# Patient Record
Sex: FEMALE | Race: WHITE | Employment: OTHER | URBAN - METROPOLITAN AREA
[De-identification: names, ages, dates, MRNs, and addresses within clinical notes are randomized per-mention and may not be internally consistent; named-entity substitution may affect disease eponyms.]

---

## 2018-01-22 ENCOUNTER — OFFICE VISIT - HEALTHEAST (OUTPATIENT)
Dept: GERIATRICS | Facility: CLINIC | Age: 83
End: 2018-01-22

## 2018-01-22 ENCOUNTER — AMBULATORY - HEALTHEAST (OUTPATIENT)
Dept: GERIATRICS | Facility: CLINIC | Age: 83
End: 2018-01-22

## 2018-01-22 DIAGNOSIS — I10 ESSENTIAL HYPERTENSION: ICD-10-CM

## 2018-01-22 DIAGNOSIS — K59.00 CONSTIPATION: ICD-10-CM

## 2018-01-22 DIAGNOSIS — R52 PAIN MANAGEMENT: ICD-10-CM

## 2018-01-22 DIAGNOSIS — E78.5 HYPERLIPIDEMIA, UNSPECIFIED HYPERLIPIDEMIA TYPE: ICD-10-CM

## 2018-01-22 DIAGNOSIS — S32.519A: ICD-10-CM

## 2018-01-22 DIAGNOSIS — G47.00 INSOMNIA: ICD-10-CM

## 2018-01-23 ENCOUNTER — AMBULATORY - HEALTHEAST (OUTPATIENT)
Dept: ADMINISTRATIVE | Facility: CLINIC | Age: 83
End: 2018-01-23

## 2018-01-25 ENCOUNTER — OFFICE VISIT - HEALTHEAST (OUTPATIENT)
Dept: GERIATRICS | Facility: CLINIC | Age: 83
End: 2018-01-25

## 2018-01-25 DIAGNOSIS — S32.511D CLOSED FRACTURE OF RIGHT SUPERIOR PUBIC RAMUS WITH ROUTINE HEALING, SUBSEQUENT ENCOUNTER: ICD-10-CM

## 2018-01-25 DIAGNOSIS — M81.0 OSTEOPOROSIS, UNSPECIFIED OSTEOPOROSIS TYPE, UNSPECIFIED PATHOLOGICAL FRACTURE PRESENCE: ICD-10-CM

## 2018-01-25 DIAGNOSIS — M15.0 PRIMARY OSTEOARTHRITIS INVOLVING MULTIPLE JOINTS: ICD-10-CM

## 2018-01-29 ENCOUNTER — OFFICE VISIT - HEALTHEAST (OUTPATIENT)
Dept: GERIATRICS | Facility: CLINIC | Age: 83
End: 2018-01-29

## 2018-01-29 DIAGNOSIS — M81.0 OSTEOPOROSIS, UNSPECIFIED OSTEOPOROSIS TYPE, UNSPECIFIED PATHOLOGICAL FRACTURE PRESENCE: ICD-10-CM

## 2018-01-29 DIAGNOSIS — S32.511D CLOSED FRACTURE OF RIGHT SUPERIOR PUBIC RAMUS WITH ROUTINE HEALING, SUBSEQUENT ENCOUNTER: ICD-10-CM

## 2018-01-29 DIAGNOSIS — I10 ESSENTIAL HYPERTENSION: ICD-10-CM

## 2018-01-29 DIAGNOSIS — R52 PAIN MANAGEMENT: ICD-10-CM

## 2018-01-29 DIAGNOSIS — R68.89 SUSPECTED DEEP TISSUE INJURY: ICD-10-CM

## 2018-01-29 DIAGNOSIS — E78.5 HYPERLIPIDEMIA, UNSPECIFIED HYPERLIPIDEMIA TYPE: ICD-10-CM

## 2018-02-01 ENCOUNTER — OFFICE VISIT - HEALTHEAST (OUTPATIENT)
Dept: GERIATRICS | Facility: CLINIC | Age: 83
End: 2018-02-01

## 2018-02-01 DIAGNOSIS — S32.511D CLOSED FRACTURE OF RIGHT SUPERIOR PUBIC RAMUS WITH ROUTINE HEALING, SUBSEQUENT ENCOUNTER: ICD-10-CM

## 2018-02-01 DIAGNOSIS — R52 PAIN MANAGEMENT: ICD-10-CM

## 2018-02-01 DIAGNOSIS — M81.0 OSTEOPOROSIS, UNSPECIFIED OSTEOPOROSIS TYPE, UNSPECIFIED PATHOLOGICAL FRACTURE PRESENCE: ICD-10-CM

## 2018-02-05 ENCOUNTER — OFFICE VISIT - HEALTHEAST (OUTPATIENT)
Dept: GERIATRICS | Facility: CLINIC | Age: 83
End: 2018-02-05

## 2018-02-05 DIAGNOSIS — I10 ESSENTIAL HYPERTENSION: ICD-10-CM

## 2018-02-05 DIAGNOSIS — R68.89 SUSPECTED DEEP TISSUE INJURY: ICD-10-CM

## 2018-02-05 DIAGNOSIS — R52 PAIN MANAGEMENT: ICD-10-CM

## 2018-02-05 DIAGNOSIS — R60.0 BILATERAL LOWER EXTREMITY EDEMA: ICD-10-CM

## 2018-02-05 DIAGNOSIS — S32.511D CLOSED FRACTURE OF RIGHT SUPERIOR PUBIC RAMUS WITH ROUTINE HEALING, SUBSEQUENT ENCOUNTER: ICD-10-CM

## 2018-02-05 DIAGNOSIS — E78.5 HYPERLIPIDEMIA, UNSPECIFIED HYPERLIPIDEMIA TYPE: ICD-10-CM

## 2018-02-08 ENCOUNTER — OFFICE VISIT - HEALTHEAST (OUTPATIENT)
Dept: GERIATRICS | Facility: CLINIC | Age: 83
End: 2018-02-08

## 2018-02-08 DIAGNOSIS — R52 PAIN MANAGEMENT: ICD-10-CM

## 2018-02-08 DIAGNOSIS — S32.511D CLOSED FRACTURE OF RIGHT SUPERIOR PUBIC RAMUS WITH ROUTINE HEALING, SUBSEQUENT ENCOUNTER: ICD-10-CM

## 2018-02-13 ENCOUNTER — OFFICE VISIT - HEALTHEAST (OUTPATIENT)
Dept: GERIATRICS | Facility: CLINIC | Age: 83
End: 2018-02-13

## 2018-02-13 DIAGNOSIS — I10 ESSENTIAL HYPERTENSION: ICD-10-CM

## 2018-02-13 DIAGNOSIS — G47.00 INSOMNIA: ICD-10-CM

## 2018-02-13 DIAGNOSIS — S32.511D CLOSED FRACTURE OF RIGHT SUPERIOR PUBIC RAMUS WITH ROUTINE HEALING, SUBSEQUENT ENCOUNTER: ICD-10-CM

## 2018-02-13 DIAGNOSIS — R52 PAIN MANAGEMENT: ICD-10-CM

## 2018-02-13 DIAGNOSIS — R60.0 BILATERAL LOWER EXTREMITY EDEMA: ICD-10-CM

## 2018-02-15 ENCOUNTER — OFFICE VISIT - HEALTHEAST (OUTPATIENT)
Dept: GERIATRICS | Facility: CLINIC | Age: 83
End: 2018-02-15

## 2018-02-15 DIAGNOSIS — M81.0 OSTEOPOROSIS, UNSPECIFIED OSTEOPOROSIS TYPE, UNSPECIFIED PATHOLOGICAL FRACTURE PRESENCE: ICD-10-CM

## 2018-02-15 DIAGNOSIS — M15.0 PRIMARY OSTEOARTHRITIS INVOLVING MULTIPLE JOINTS: ICD-10-CM

## 2018-02-15 DIAGNOSIS — S32.511D CLOSED FRACTURE OF RIGHT SUPERIOR PUBIC RAMUS WITH ROUTINE HEALING, SUBSEQUENT ENCOUNTER: ICD-10-CM

## 2018-02-15 DIAGNOSIS — R52 PAIN MANAGEMENT: ICD-10-CM

## 2018-02-19 ENCOUNTER — AMBULATORY - HEALTHEAST (OUTPATIENT)
Dept: GERIATRICS | Facility: CLINIC | Age: 83
End: 2018-02-19

## 2019-12-23 ENCOUNTER — THERAPY VISIT (OUTPATIENT)
Dept: PHYSICAL THERAPY | Facility: CLINIC | Age: 84
End: 2019-12-23
Payer: COMMERCIAL

## 2019-12-23 DIAGNOSIS — G89.29 CHRONIC BILATERAL LOW BACK PAIN WITHOUT SCIATICA: Primary | ICD-10-CM

## 2019-12-23 DIAGNOSIS — M54.50 CHRONIC BILATERAL LOW BACK PAIN WITHOUT SCIATICA: Primary | ICD-10-CM

## 2019-12-23 PROCEDURE — 97110 THERAPEUTIC EXERCISES: CPT | Mod: GP | Performed by: PHYSICAL THERAPIST

## 2019-12-23 PROCEDURE — 97161 PT EVAL LOW COMPLEX 20 MIN: CPT | Mod: GP | Performed by: PHYSICAL THERAPIST

## 2019-12-23 NOTE — PROGRESS NOTES
[facility] Initial Evaluation -- Lumbar    Date: December 23, 2019  Daksha Chapman is a 89 year old female with a LS condition.   Referral: GP  Work mechanical stresses:    Employment status:    Leisure mechanical stresses:   Functional disability score (CHRISTIANO/STarT Back):  See chart  VAS score (0-10): 10/10  Patient goals/expectations:  Decrease the pain with standing and walking    HISTORY:    Present symptoms: B LS pain, L>R  And L ant thigh pain(since her hip revision).  Pain quality (sharp/shooting/stabbing/aching/burning/cramping):  Burning stabbing  Paresthesia (yes/no):  no    Present since (onset date): 6 years ago. MD order [id number]     Symptoms (improving/unchanging/worsening):  unchanged for 1 year     Symptoms commenced as a result of: FRANDY  Condition occurred in the following environment:   unknown     Symptoms at onset (back/thigh/leg): LBP  Constant symptoms (back/thigh/leg):     Intermittent symptoms (back/thigh/leg): yes    Symptoms are made worse with the following: Sometimes Standing, Sometimes Walking and Time of day - Always AM   Symptoms are made better with the following: Always Sitting lying down and heat always    Disturbed sleep (yes/no):  no   Sleeping postures (prone/sup/side R/L):     Previous episodes (0/1-5/6-10/11+):  Year of first episode:     Previous history:   Previous treatments: none      Specific Questions:  Cough/Sneeze/Strain (pos/neg): neg  Bowel/Bladder (normal/abnormal): normal  Gait (normal/abnormal): normal  Medications (nil/NSAIDS/analg/steroids/anticoag/other):  Narcotics/Opiods  Medical allergies:  none  General health (excellent/good/fair/poor):  good  Pertinent medical history:  3 decompression LS surgeries with last one in 2012(revision)  Imaging (None/Xray/MRI/Other):  MRIs  Recent or major surgery (yes/no):  no  Night pain (yes/no): no  Accidents (yes/no): no  Unexplained weight loss (yes/no): no  Barriers at home: lives alone, has a    Other red flags: none    EXAMINATION    Posture:   Sitting (good/fair/poor): fair  Standing (good/fair/poor):fair  Lordosis (red/acc/normal): dec  Correction of posture (better/worse/no effect): worse    Lateral Shift (right/left/nil): no  Relevant (yes/no):    Other Observations:     Neurological:    Motor deficit:    Reflexes:    Sensory deficit:    Dural signs:      Movement Loss:   Silvino Mod Min Nil Pain   Flexion  +   decreases   Extension +    increases   Side Gliding R   +     Side Gliding L   +       Test Movements:   During: produces, abolishes, increases, decreases, no effect, centralizing, peripheralizing   After: better, worse, no better, no worse, no effect, centralized, peripheralized    Pretest symptoms standing:    Symptoms During Symptoms After ROM increased ROM decreased No Effect   FIS          Rep FIS          EIS          Rep EIS          Pretest symptoms lying:     Symptoms During Symptoms After ROM increased ROM decreased No Effect   EMMA          Rep EMMA          EIL          Rep EIL          If required, pretest symptoms:    Symptoms During Symptoms After ROM increased ROM decreased No Effect   SGIS - R          Rep SGIS - R          SGIS - L          Rep SGIS - L            Static Tests:  Sitting slouched:    Sitting erect:    Standing slouched   Standing erect:    Lying prone in extension:   Long sitting:      Other Tests: FISit 2/5 reps hold 3 sec no effect inc ROM, better standing and extension inc ROM extension    Provisional Classification:  Derangement - Bilateral, symmetrical, symptoms above knee    Principle of Management:  Education:  Etiology DP HEP    Equipment provided:    Mechanical therapy (Y/N):  Y   Extension principle:    Lateral Principle:    Flexion principle:  FISit 8-10 reps 3 x day  Other:      ASSESSMENT/PLAN:    Patient is a 89 year old female with lumbar complaints.    Patient has the following significant findings with corresponding treatment plan.                 Diagnosis 1:  LBP  Pain -  self management, education, directional preference exercise and home program  Decreased ROM/flexibility - therapeutic exercise, therapeutic activity and home program  Decreased joint mobility - therapeutic exercise, therapeutic activity and home program  Decreased function - therapeutic activities and home program        Previous and current functional limitations:  (See Goal Flow Sheet for this information)    Short term and Long term goals: (See Goal Flow Sheet for this information)     Communication ability:  Patient appears to be able to clearly communicate and understand verbal and written communication and follow directions correctly.  Treatment Explanation - The following has been discussed with the patient:   RX ordered/plan of care  Anticipated outcomes  Possible risks and side effects  This patient would benefit from PT intervention to resume normal activities.   Rehab potential is good.    Frequency:  1 X week, once daily  Duration:  for 6 weeks  Discharge Plan:  Achieve all LTG.  Independent in home treatment program.  Reach maximal therapeutic benefit.    Please refer to the daily flowsheet for treatment today, total treatment time and time spent performing 1:1 timed codes.

## 2019-12-23 NOTE — LETTER
[facility]  [address]  [facility] [address]  [phone]    2019    Re: Daksha Chapman   :   10/29/1929  MRN:  [id number]   REFERRING PHYSICIAN:   Mikal Wu    [facility]    Date of Initial Evaluation:  2019  Visits:   1  Reason for Referral:  Chronic bilateral low back pain without sciatica    [facility] Initial Evaluation -- Lumbar  Date: 2019  Daksha Chapman is a 89 year old female with a LS condition.   Referral: GP  Work mechanical stresses:    Employment status:    Leisure mechanical stresses:   Functional disability score (CHRISTIANO/STarT Back):  See chart  VAS score (0-10): 10/10  Patient goals/expectations:  Decrease the pain with standing and walking    HISTORY:  Present symptoms: B LS pain, L>R  And L ant thigh pain(since her hip revision).  Pain quality (sharp/shooting/stabbing/aching/burning/cramping):  Burning stabbing  Paresthesia (yes/no):  no  Present since (onset date): 6 years ago. MD order [id number]     Symptoms (improving/unchanging/worsening):  unchanged for 1 year   Symptoms commenced as a result of: FRANDY  Condition occurred in the following environment:   unknown   Symptoms at onset (back/thigh/leg): LBP  Constant symptoms (back/thigh/leg):   Intermittent symptoms (back/thigh/leg): yes  Symptoms are made worse with the following: Sometimes Standing, Sometimes Walking and Time of day - Always AM   Symptoms are made better with the following: Always Sitting lying down and heat always  Disturbed sleep (yes/no):  no  Sleeping postures (prone/sup/side R/L):   Previous episodes (0/1-5/6-10/11+):    Year of first episode:   Previous history:   Previous treatments: none          Re: Daksha Chapman   :   10/29/1929    Specific Questions:  Cough/Sneeze/Strain (pos/neg): neg  Bowel/Bladder (normal/abnormal):  normal  Gait (normal/abnormal): normal  Medications (nil/NSAIDS/analg/steroids/anticoag/other):  Narcotics/Opiods  Medical allergies:  none  General health (excellent/good/fair/poor):  good  Pertinent medical history:  3 decompression LS surgeries with last one in (revision)  Imaging (None/Xray/MRI/Other):  MRIs  Recent or major surgery (yes/no):  no  Night pain (yes/no): no  Accidents (yes/no): no  Unexplained weight loss (yes/no): no  Barriers at home: lives alone, has a   Other red flags: none    EXAMINATION    Posture:   Sitting (good/fair/poor): fair  Standing (good/fair/poor):fair  Lordosis (red/acc/normal): dec  Correction of posture (better/worse/no effect): worse  Lateral Shift (right/left/nil): no  Relevant (yes/no):    Other Observations:   Neurological:  Motor deficit:    Reflexes:    Sensory deficit:    Dural signs:    Movement Loss:   Silvino Mod Min Nil Pain   Flexion  +   decreases   Extension +    increases   Side Gliding R   +     Side Gliding L   +     Test Movements:   During: produces, abolishes, increases, decreases, no effect, centralizing, peripheralizing   After: better, worse, no better, no worse, no effect, centralized, peripheralized    Pretest symptoms standing:    Symptoms During Symptoms After ROM increased ROM decreased No Effect   FIS          Rep FIS          EIS          Rep EIS              Re: Daksha Chapman   :   10/29/1929    Pretest symptoms lying:     Symptoms During Symptoms After ROM increased ROM decreased No Effect   EMMA          Rep EMMA          EIL          Rep EIL          If required, pretest symptoms:    Symptoms During Symptoms After ROM increased ROM decreased No Effect   SGIS - R          Rep SGIS - R          SGIS - L          Rep SGIS - L          Static Tests:  Sitting slouched:     Sitting erect:    Standing slouched    Standing erect:    Lying prone in extension:    Long sitting:    Other Tests: FISit 2/5 reps hold 3 sec no effect inc ROM,  better standing and extension inc ROM extension  Provisional Classification:  Derangement - Bilateral, symmetrical, symptoms above knee  Principle of Management:  Education:  Etiology DP HEP      Equipment provided:    Mechanical therapy (Y/N):  Y   Extension principle:      Lateral Principle:    Flexion principle:  FISit 8-10 reps 3 x day    Other:      ASSESSMENT/PLAN:  Patient is a 89 year old female with lumbar complaints.    Patient has the following significant findings with corresponding treatment plan.                Diagnosis 1:  LBP  Pain -  self management, education, directional preference exercise and home program  Decreased ROM/flexibility - therapeutic exercise, therapeutic activity and home program  Decreased joint mobility - therapeutic exercise, therapeutic activity and home program  Decreased function - therapeutic activities and home program  Previous and current functional limitations:  (See Goal Flow Sheet for this information)    Short term and Long term goals: (See Goal Flow Sheet for this information)   Communication ability:  Patient appears to be able to clearly communicate and understand verbal and written communication and follow directions correctly.  Treatment Explanation - The following has been discussed with the patient:   RX ordered/plan of care, Anticipated outcomes, Possible risks and side effects            Re: Daksha Chapman   :   10/29/1929     This patient would benefit from PT intervention to resume normal activities.   Rehab potential is good.  Frequency:  1 X week, once daily  Duration:  for 6 weeks  Discharge Plan:  Achieve all LTG.  Independent in home treatment program.  Reach maximal therapeutic benefit.    Thank you for your referral.    INQUIRIES        Therapist:   Tennille Botello, PT, Cert. MDT  [facility]  [address]  [facility] [address]  Phone: [phone]  Fax: [phone]

## 2020-01-08 ENCOUNTER — THERAPY VISIT (OUTPATIENT)
Dept: PHYSICAL THERAPY | Facility: CLINIC | Age: 85
End: 2020-01-08
Payer: COMMERCIAL

## 2020-01-08 DIAGNOSIS — M54.50 CHRONIC BILATERAL LOW BACK PAIN WITHOUT SCIATICA: Primary | ICD-10-CM

## 2020-01-08 DIAGNOSIS — G89.29 CHRONIC BILATERAL LOW BACK PAIN WITHOUT SCIATICA: Primary | ICD-10-CM

## 2020-01-08 PROCEDURE — 97110 THERAPEUTIC EXERCISES: CPT | Mod: GP | Performed by: PHYSICAL THERAPIST

## 2020-01-08 PROCEDURE — 97530 THERAPEUTIC ACTIVITIES: CPT | Mod: GP | Performed by: PHYSICAL THERAPIST

## 2020-04-09 PROBLEM — M54.50 CHRONIC BILATERAL LOW BACK PAIN WITHOUT SCIATICA: Status: RESOLVED | Noted: 2019-12-23 | Resolved: 2020-04-09

## 2020-04-09 PROBLEM — G89.29 CHRONIC BILATERAL LOW BACK PAIN WITHOUT SCIATICA: Status: RESOLVED | Noted: 2019-12-23 | Resolved: 2020-04-09

## 2021-05-31 VITALS — BODY MASS INDEX: 24.1 KG/M2 | WEIGHT: 123.4 LBS

## 2021-06-01 VITALS — BODY MASS INDEX: 25.58 KG/M2 | WEIGHT: 131 LBS

## 2021-06-15 NOTE — PROGRESS NOTES
[facility]    Facility:   [facility] [[id number]]   Code Status: DNR      CHIEF COMPLAINT/REASON FOR VISIT:  Chief Complaint   Patient presents with     Review Of Multiple Medical Conditions       HISTORY:      HPI: Daksha is a 88 y.o. female who is recovering from a pelvic fracture.  She tells me how it is so frustrating that some days she feels better and is able to do a lot in therapy and then the next day she will have significant pain and not be able to participate as she would like.    On review of systems she is not experiencing fevers or chills, no URI symptoms of nasal congestion or sore throat, no chest pain no shortness of breath nor cough.  No abdominal pain or nausea.  No dysuria.  No headache.    Past Medical History:   Diagnosis Date     Arthritis      Closed fracture of superior ramus of right pubis      HLD (hyperlipidemia)      HTN (hypertension)      Spinal stenosis              Family History   Problem Relation Age of Onset     Breast cancer Mother      Heart disease Mother      Hypertension Father      Stroke Father      Social History     Social History     Marital status: Unknown     Spouse name: N/A     Number of children: N/A     Years of education: N/A     Social History Main Topics     Smoking status: Never Smoker     Smokeless tobacco: Never Used     Alcohol use No     Drug use: No     Sexual activity: Not on file     Other Topics Concern     Not on file     Social History Narrative     No narrative on file         Review of Systems    .  Vitals:    02/01/18 0841   BP: 115/64   Pulse: 89   Resp: 16   Temp: 97.7  F (36.5  C)   SpO2: 96%       Physical Exam   Constitutional: She is oriented to person, place, and time. No distress.   Cardiovascular: Normal rate, regular rhythm and normal heart sounds.    Pulmonary/Chest: Breath sounds normal.   Musculoskeletal: She exhibits no edema.   Neurological: She is alert and oriented to person, place, and time.    Psychiatric: She has a normal mood and affect.   Nursing note and vitals reviewed.        LABS:   No new laboratory testing    ASSESSMENT:      ICD-10-CM    1. Closed fracture of right superior pubic ramus with routine healing, subsequent encounter S32.511D    2. Pain management R52    3. Osteoporosis, unspecified osteoporosis type, unspecified pathological fracture presence M81.0        PLAN:    I offered encouragement that she will not do damage to the area of fracture healing by pushing herself in therapy, but also reminded her that what she does today, she may feel tomorrow, in the sense that if she overdoes the activities she may experience more pain the next day, but overall she will have a continued forward progress.  It is possible that she needs scheduled medications for pain at this stage of healing.      Electronically signed by: Jackson Weston MD

## 2021-06-15 NOTE — PROGRESS NOTES
[facility]    DATE: 2018    NAME:  Daksha Chapman             :  10/29/1929  MRN: [id number]  CODE STATUS:  DNR    FACILITY:  [facility] [[id number]]       ROOM:   215    CHIEF COMPLAINT/REASON FOR VISIT:  Chief Complaint   Patient presents with     Problem Visit     Bilateral lowere extremity edema     HISTORY OF PRESENT ILLNESS: Daksha Chapman is a 88 y.o. female who presented to the ED with right hip pain sustained after a mechanical fall at home.  She was ambulating in the kitchen with her walker and slipped. She landed on her right hip and hit the aback of her head but had no LOC.  Imaging revealed a right pubic rami fracture.  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 Due to her injuries she wasn't able to return home and was transferred to TCU for continued rehabilitaiton.     Today, patient is seen sitting in her recliner at the bedside.  She continues to report uncontrolled pain on her right hip.  She also has bilateral lower extremity edema that appears to be getting worse. She has some SDTI on the coccyx area but reports that the barrier cream isn't applied per schedule.  No insomnia.      Past Medical History:   Diagnosis Date     Arthritis      Closed fracture of superior ramus of right pubis      HLD (hyperlipidemia)      HTN (hypertension)      Spinal stenosis      Past Surgical History:   Procedure Laterality Date     CARPAL TUNNEL RELEASE       DILATION AND CURETTAGE OF UTERUS       LUMBAR LAMINECTOMY      spinal stenosis     MANIPULATION KNEE JOINT Left 04/15/2013     TONSILLECTOMY AND ADENOIDECTOMY       TOTAL HIP ARTHROPLASTY Bilateral      Family History   Problem Relation Age of Onset     Breast cancer Mother      Heart disease Mother      Hypertension Father      Stroke Father      Social History     Social History     Marital status: Unknown     Spouse name: N/A     Number of children: N/A     Years of education: N/A      Occupational History     Not on file.     Social History Main Topics     Smoking status: Never Smoker     Smokeless tobacco: Never Used     Alcohol use No     Drug use: No     Sexual activity: Not on file     Other Topics Concern     Not on file     Social History Narrative     No narrative on file     Allergies   Allergen Reactions     Blood-Group Specific Substance Other (See Comments)     Patient has anti-M.  Blood product orders maybe delayed.  Please draw one red top and two purple top tubes for all Type and Screen/Type and Crossmatch orders.     Current Outpatient Prescriptions   Medication Sig Dispense Refill     acetaminophen (TYLENOL) 325 MG tablet Take 650 mg by mouth every 6 (six) hours as needed for pain.       aspirin 81 MG EC tablet Take 81 mg by mouth daily.       atorvastatin (LIPITOR) 20 MG tablet Take 20 mg by mouth at bedtime.       bisacodyl (DULCOLAX) 10 mg suppository Insert 10 mg into the rectum daily as needed.       calcium, as carbonate, (TUMS) 200 mg calcium (500 mg) chewable tablet Chew 1 tablet 3 (three) times a day as needed for heartburn.       chlorthalidone (HYGROTEN) 25 MG tablet Take 12.5 mg by mouth daily.       gabapentin (NEURONTIN) 300 MG capsule Take 300 mg by mouth 2 (two) times a day.       HYDROcodone-acetaminophen 5-325 mg per tablet Take 1-2 tablets by mouth every 6 (six) hours as needed for pain.       melatonin 3 mg Tab tablet Take 3 mg by mouth at bedtime.       MULTIVIT-MIN/FA/LYCOPEN/LUTEIN (CERTAVITE SENIOR-ANTIOXIDANT ORAL) Take 1 tablet by mouth daily.       polyethylene glycol (MIRALAX) 17 gram packet Take 17 g by mouth daily.       sodium phosphates 133 mL (FOR FLEET) 19-7 gram/118 mL Enem rectal enema Insert 1 enema into the rectum every 3 (three) days.       tiZANidine (ZANAFLEX) 4 MG tablet Take 4 mg by mouth every 8 (eight) hours as needed.       No current facility-administered medications for this visit.      REVIEW OF SYSTEMS:    Currently, no fever,  chills, or rigors. Does not have any visual or hearing problems. Denies any chest pain, headaches, palpitations, lightheadedness, dizziness, shortness of breath, or cough. Appetite is good. Denies any GERD symptoms. Denies any difficulty with swallowing, nausea, or vomiting.  Denies any abdominal pain, diarrhea or constipation. Denies any urinary symptoms. No insomnia. No active bleeding. No rash.     PHYSICAL EXAMINATION:  Vitals:    02/05/18 2237   BP: 106/58   Pulse: 72   Resp: 16   Temp: 97.5  F (36.4  C)   SpO2: 94%   Weight: 123 lb 6.4 oz (56 kg)       GENERAL: Awake, Alert, oriented x3, not in any form of acute distress, answers questions appropriately, follows simple commands, conversant  HEENT: Head is normocephalic with normal hair distribution. No evidence of trauma. Ears: No acute purulent discharge. Eyes: Conjunctivae pink with no scleral jaundice. Nose: Normal mucosa and septum. NECK: Supple with no cervical or supraclavicular lymphadenopathy. Trachea is midline.   CHEST: No tenderness or deformity, no crepitus  LUNG: Clear to auscultation with good chest expansion. There are no crackles or wheezes, normal AP diameter.  BACK: No kyphosis of the thoracic spine. Symmetric, no curvature, ROM normal, no CVA tenderness, no spinal tenderness   CVS: There is good S1  S2, there are no murmurs, rubs, gallops, or heaves, rhythm is regular,  2+ pulses symmetric in all extremities.  ABDOMEN: Globular and soft, nontender to palpation, non distended, no masses, no organomegaly, good bowel sounds, no rebound or guarding, no peritoneal signs.   EXTREMITIES: Full range of motion on both upper and lower extremities, there is no tenderness to palpation, bilateral pedal edema, no cyanosis or clubbing, no calf tenderness.  Pulses equal in all extremities, normal cap refill, no joint swelling.  SKIN: Warm and dry, no erythema noted.  Skin color, texture, no rashes or lesions.  NEUROLOGICAL: The patient is oriented to  person, place and time. Strength and sensation are grossly intact. Face is symmetric.    LABS:      Lab Results   Component Value Date    HGB 8.1 (L) 10/11/2017     ASSESSMENT/PLAN:    1. Pain management - Worsening, Start Tramadol 25 mg TID and TID prn.  Oxycodone and Zanaflex prn    2. Suspected deep tissue injury - Staff to apply barrier cream TID and as needed   3. Closed fracture of right superior pubic ramus with routine healing, subsequent encounter    4. Essential hypertension - Blood pressures are within target range, will continue Chlorthalidone. Check BMP.   5. Hyperlipidemia, unspecified hyperlipidemia type - Continue Lipitor   6. Bilateral lower extremity edema - Start LORETO hose on in the morning and off in the evening           Plan:  Check CBC, TSH, and Vitamin D        Electronically signed by:  Giovani Perdomo CNP

## 2021-06-15 NOTE — PROGRESS NOTES
[facility]    DATE: 2018    NAME:  Daksha Chapman             :  10/29/1929  MRN: [id number]  CODE STATUS:  DNR    FACILITY:  [facility] [place] [[id number]]       ROOM:   215    CHIEF COMPLAINT/REASON FOR VISIT:  Chief Complaint   Patient presents with     Problem Visit     Right pubic rami fracture     HISTORY OF PRESENT ILLNESS: Daksha Chapman is a 88 y.o. female who presented to the ED with right hip pain sustained after a mechanical fall at home.  She was ambulating in the kitchen with her walker and slipped. She landed on her right hip and hit the aback of her head but had no LOC.  Imaging revealed a right pubic rami fracture.  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 Due to her injuries she wasn't able to return home and was transferred to TCU for continued rehabilitaiton.     Past Medical History:   Diagnosis Date     Arthritis      Closed fracture of superior ramus of right pubis      HLD (hyperlipidemia)      HTN (hypertension)      Spinal stenosis      Past Surgical History:   Procedure Laterality Date     CARPAL TUNNEL RELEASE       DILATION AND CURETTAGE OF UTERUS       LUMBAR LAMINECTOMY      spinal stenosis     MANIPULATION KNEE JOINT Left 04/15/2013     TONSILLECTOMY AND ADENOIDECTOMY       TOTAL HIP ARTHROPLASTY Bilateral      Family History   Problem Relation Age of Onset     Breast cancer Mother      Heart disease Mother      Hypertension Father      Stroke Father      Social History     Social History     Marital status: Unknown     Spouse name: N/A     Number of children: N/A     Years of education: N/A     Occupational History     Not on file.     Social History Main Topics     Smoking status: Never Smoker     Smokeless tobacco: Never Used     Alcohol use No     Drug use: No     Sexual activity: Not on file     Other Topics Concern     Not on file     Social History Narrative     No narrative on file     Allergies   Allergen  Reactions     Blood-Group Specific Substance Other (See Comments)     Patient has anti-M.  Blood product orders maybe delayed.  Please draw one red top and two purple top tubes for all Type and Screen/Type and Crossmatch orders.     Current Outpatient Prescriptions   Medication Sig Dispense Refill     acetaminophen (TYLENOL) 325 MG tablet Take 650 mg by mouth every 6 (six) hours as needed for pain.       aspirin 81 MG EC tablet Take 81 mg by mouth daily.       atorvastatin (LIPITOR) 20 MG tablet Take 20 mg by mouth at bedtime.       bisacodyl (DULCOLAX) 10 mg suppository Insert 10 mg into the rectum daily as needed.       calcium, as carbonate, (TUMS) 200 mg calcium (500 mg) chewable tablet Chew 1 tablet 3 (three) times a day as needed for heartburn.       chlorthalidone (HYGROTEN) 25 MG tablet Take 12.5 mg by mouth daily.       gabapentin (NEURONTIN) 300 MG capsule Take 300 mg by mouth 2 (two) times a day.       HYDROcodone-acetaminophen 5-325 mg per tablet Take 1-2 tablets by mouth every 6 (six) hours as needed for pain.       melatonin 3 mg Tab tablet Take 3 mg by mouth at bedtime.       MULTIVIT-MIN/FA/LYCOPEN/LUTEIN (CERTAVITE SENIOR-ANTIOXIDANT ORAL) Take 1 tablet by mouth daily.       polyethylene glycol (MIRALAX) 17 gram packet Take 17 g by mouth daily.       sodium phosphates 133 mL (FOR FLEET) 19-7 gram/118 mL Enem rectal enema Insert 1 enema into the rectum every 3 (three) days.       tiZANidine (ZANAFLEX) 4 MG tablet Take 4 mg by mouth every 8 (eight) hours as needed.       No current facility-administered medications for this visit.      REVIEW OF SYSTEMS:    Currently, no fever, chills, or rigors. Does not have any visual or hearing problems. Denies any chest pain, headaches, palpitations, lightheadedness, dizziness, shortness of breath, or cough. Appetite is good. Denies any GERD symptoms. Denies any difficulty with swallowing, nausea, or vomiting.  Denies any abdominal pain, diarrhea or constipation.  Denies any urinary symptoms. No insomnia. No active bleeding. No rash.     PHYSICAL EXAMINATION:  Vitals:    01/22/18 2219   BP: 101/54   Pulse: 73   Resp: 16   Temp: 97.9  F (36.6  C)   SpO2: 97%   Weight: 123 lb 6.4 oz (56 kg)       GENERAL: Awake, Alert, oriented x3, not in any form of acute distress, answers questions appropriately, follows simple commands, conversant  HEENT: Head is normocephalic with normal hair distribution. No evidence of trauma. Ears: No acute purulent discharge. Eyes: Conjunctivae pink with no scleral jaundice. Nose: Normal mucosa and septum. NECK: Supple with no cervical or supraclavicular lymphadenopathy. Trachea is midline.   CHEST: No tenderness or deformity, no crepitus  LUNG: Clear to auscultation with good chest expansion. There are no crackles or wheezes, normal AP diameter.  BACK: No kyphosis of the thoracic spine. Symmetric, no curvature, ROM normal, no CVA tenderness, no spinal tenderness   CVS: There is good S1  S2, there are no murmurs, rubs, gallops, or heaves, rhythm is regular,  2+ pulses symmetric in all extremities.  ABDOMEN: Globular and soft, nontender to palpation, non distended, no masses, no organomegaly, good bowel sounds, no rebound or guarding, no peritoneal signs.   EXTREMITIES: Full range of motion on both upper and lower extremities, there is no tenderness to palpation, bilateral pedal edema, no cyanosis or clubbing, no calf tenderness.  Pulses equal in all extremities, normal cap refill, no joint swelling.  SKIN: Warm and dry, no erythema noted.  Skin color, texture, no rashes or lesions.  NEUROLOGICAL: The patient is oriented to person, place and time. Strength and sensation are grossly intact. Face is symmetric.    LABS:      Lab Results   Component Value Date    HGB 8.1 (L) 10/11/2017     ASSESSMENT/PLAN:    1. Pain management - Continue prn Zanaflex, Norco, Gabapentin, and Tylenol   2. Essential hypertension - Blood pressures are within target range, will  continue Chlorthalidone. Check BMP   3. Hyperlipidemia, unspecified hyperlipidemia type - Continue Lipitor   4. Insomnia - Continue Melatonin   5. Constipation - Continue Miralax and prn Dulcolax and Fleet enema   6. Fracture of superior pubic ramus - See #1, continue to work with therapy, followed by Orthopedics.  Continue Calcium.  Check TSH and Vitamin D level         Electronically signed by:  Giovani Perdomo CNP    35 minutes TT of which 50% was spent in counseling and coordination of care of the above plan.    Time spent in interview and examination of patient, review of available records, and discussion with nursing staff. Continue care plan, efforts at therapy, and monitor nutritional status.

## 2021-06-15 NOTE — PROGRESS NOTES
[facility]      Facility:    [facility] [[id number]]  Code Status: DNR      Chief Complaint/Reason for Visit:  Chief Complaint   Patient presents with     H & P       HPI:   Daksha is a 88 y.o. female who fell in the kitchen and fractured her right superior pubic ramus.  After hospitalization she was sent to transitional care unit for further therapies and monitoring of her progress of strengthening and endurance and gait stability.  She has underlying medical problems of osteoporosis and osteoarthritis in multiple sites.  She is status post total hip arthroplasty bilaterally and she also has lumbar spinal stenosis with radiculitis.  She was  2 years ago after 50 years of marriage.  Her stepson lives in [place].  She considers [employer] in [place] as her [place] family.    She did have some constipation in the hospital but now that has resolved.  Physical therapy states that she is moving slowly and still has much pain is able to walk for 45 feet.  She denies fevers or chills nor nasal congestion or sore throat.  No cough nor chest pain or shortness of breath.  No abdominal pain or nausea.  No dysuria.    Past Medical History:  Past Medical History:   Diagnosis Date     Arthritis      Closed fracture of superior ramus of right pubis      HLD (hyperlipidemia)      HTN (hypertension)      Spinal stenosis            Surgical History:  Past Surgical History:   Procedure Laterality Date     CARPAL TUNNEL RELEASE       DILATION AND CURETTAGE OF UTERUS       LUMBAR LAMINECTOMY      spinal stenosis     MANIPULATION KNEE JOINT Left 04/15/2013     TONSILLECTOMY AND ADENOIDECTOMY       TOTAL HIP ARTHROPLASTY Bilateral        Family History:   Family History   Problem Relation Age of Onset     Breast cancer Mother      Heart disease Mother      Hypertension Father      Stroke Father        Social History:    Social History     Social History     Marital status: Unknown     Spouse  name: N/A     Number of children: N/A     Years of education: N/A     Social History Main Topics     Smoking status: Never Smoker     Smokeless tobacco: Never Used     Alcohol use No     Drug use: No     Sexual activity: Not on file     Other Topics Concern     Not on file     Social History Narrative     No narrative on file          Review of Systems   All other systems reviewed and are negative.      Vitals:    01/23/18 1603   BP: 103/60   Pulse: 99   Resp: 16   Temp: 98  F (36.7  C)   SpO2: 95%       Physical Exam   Constitutional: She is oriented to person, place, and time. No distress.   HENT:   Mouth/Throat: Oropharynx is clear and moist.   Eyes: Right eye exhibits no discharge. Left eye exhibits no discharge.   Neck: No thyromegaly present.   Cardiovascular: Normal rate, regular rhythm and normal heart sounds.    Pulmonary/Chest: Effort normal and breath sounds normal.   Abdominal: Soft. Bowel sounds are normal. She exhibits no distension. There is no tenderness.   Musculoskeletal: She exhibits no edema.   Lymphadenopathy:     She has no cervical adenopathy.   Neurological: She is alert and oriented to person, place, and time.   Skin: Skin is warm and dry.   Psychiatric: She has a normal mood and affect. Her behavior is normal.   Nursing note and vitals reviewed.      Medication List:  Current Outpatient Prescriptions   Medication Sig     acetaminophen (TYLENOL) 325 MG tablet Take 650 mg by mouth every 6 (six) hours as needed for pain.     aspirin 81 MG EC tablet Take 81 mg by mouth daily.     atorvastatin (LIPITOR) 20 MG tablet Take 20 mg by mouth at bedtime.     bisacodyl (DULCOLAX) 10 mg suppository Insert 10 mg into the rectum daily as needed.     calcium, as carbonate, (TUMS) 200 mg calcium (500 mg) chewable tablet Chew 1 tablet 3 (three) times a day as needed for heartburn.     chlorthalidone (HYGROTEN) 25 MG tablet Take 12.5 mg by mouth daily.     gabapentin (NEURONTIN) 300 MG capsule Take 300 mg by  mouth 2 (two) times a day.     HYDROcodone-acetaminophen 5-325 mg per tablet Take 1-2 tablets by mouth every 6 (six) hours as needed for pain.     melatonin 3 mg Tab tablet Take 3 mg by mouth at bedtime.     MULTIVIT-MIN/FA/LYCOPEN/LUTEIN (CERTAVITE SENIOR-ANTIOXIDANT ORAL) Take 1 tablet by mouth daily.     polyethylene glycol (MIRALAX) 17 gram packet Take 17 g by mouth daily.     sodium phosphates 133 mL (FOR FLEET) 19-7 gram/118 mL Enem rectal enema Insert 1 enema into the rectum every 3 (three) days.     tiZANidine (ZANAFLEX) 4 MG tablet Take 4 mg by mouth every 8 (eight) hours as needed.       Labs:  No new laboratory testing.    Assessment:    ICD-10-CM    1. Closed fracture of right superior pubic ramus with routine healing, subsequent encounter S32.511D    2. Primary osteoarthritis involving multiple joints M15.0    3. Osteoporosis, unspecified osteoporosis type, unspecified pathological fracture presence M81.0        Plan:  I encouraged her to do as much activity as the pain allows since I reassured her that she will not do more damage to the underlying fracture.  However the total time of healing is going to be 6-8 weeks.  Continue with therapies as already ordered.  Continue to monitor her medical condition in general.      Electronically signed by: Jackson Weston MD

## 2021-06-15 NOTE — PROGRESS NOTES
[facility]    DATE: 2018    NAME:  Daksha Chapman             :  10/29/1929  MRN: [id number]  CODE STATUS:  DNR    FACILITY:  [facility] [[id number]]       ROOM:   215    CHIEF COMPLAINT/REASON FOR VISIT:  Chief Complaint   Patient presents with     Problem Visit     Pain management and SDTI     HISTORY OF PRESENT ILLNESS: Daksha Chapman is a 88 y.o. female who presented to the ED with right hip pain sustained after a mechanical fall at home.  She was ambulating in the kitchen with her walker and slipped. She landed on her right hip and hit the aback of her head but had no LOC.  Imaging revealed a right pubic rami fracture.  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 Due to her injuries she wasn't able to return home and was transferred to TCU for continued rehabilitaiton.     Today, patient is seen sitting in her recliner at the bedside.  Staff reports uncontrolled pain on her right hip.  She notices this mostly with therapy.  She also reports some discomfort on her coccyx area that started prior to this admission. Upon assessment, it was observed that she had suspected deep tissue injury (SDTI).  She notes that she spends most of the day sitting in the chair and when in bed she is on her back.  Appetite is good.  No insomnia.  Norrmotensive.    Past Medical History:   Diagnosis Date     Arthritis      Closed fracture of superior ramus of right pubis      HLD (hyperlipidemia)      HTN (hypertension)      Spinal stenosis      Past Surgical History:   Procedure Laterality Date     CARPAL TUNNEL RELEASE       DILATION AND CURETTAGE OF UTERUS       LUMBAR LAMINECTOMY      spinal stenosis     MANIPULATION KNEE JOINT Left 04/15/2013     TONSILLECTOMY AND ADENOIDECTOMY       TOTAL HIP ARTHROPLASTY Bilateral      Family History   Problem Relation Age of Onset     Breast cancer Mother      Heart disease Mother      Hypertension Father      Stroke  Father      Social History     Social History     Marital status: Unknown     Spouse name: N/A     Number of children: N/A     Years of education: N/A     Occupational History     Not on file.     Social History Main Topics     Smoking status: Never Smoker     Smokeless tobacco: Never Used     Alcohol use No     Drug use: No     Sexual activity: Not on file     Other Topics Concern     Not on file     Social History Narrative     No narrative on file     Allergies   Allergen Reactions     Blood-Group Specific Substance Other (See Comments)     Patient has anti-M.  Blood product orders maybe delayed.  Please draw one red top and two purple top tubes for all Type and Screen/Type and Crossmatch orders.     Current Outpatient Prescriptions   Medication Sig Dispense Refill     acetaminophen (TYLENOL) 325 MG tablet Take 650 mg by mouth every 6 (six) hours as needed for pain.       aspirin 81 MG EC tablet Take 81 mg by mouth daily.       atorvastatin (LIPITOR) 20 MG tablet Take 20 mg by mouth at bedtime.       bisacodyl (DULCOLAX) 10 mg suppository Insert 10 mg into the rectum daily as needed.       calcium, as carbonate, (TUMS) 200 mg calcium (500 mg) chewable tablet Chew 1 tablet 3 (three) times a day as needed for heartburn.       chlorthalidone (HYGROTEN) 25 MG tablet Take 12.5 mg by mouth daily.       gabapentin (NEURONTIN) 300 MG capsule Take 300 mg by mouth 2 (two) times a day.       HYDROcodone-acetaminophen 5-325 mg per tablet Take 1-2 tablets by mouth every 6 (six) hours as needed for pain.       melatonin 3 mg Tab tablet Take 3 mg by mouth at bedtime.       MULTIVIT-MIN/FA/LYCOPEN/LUTEIN (CERTAVITE SENIOR-ANTIOXIDANT ORAL) Take 1 tablet by mouth daily.       polyethylene glycol (MIRALAX) 17 gram packet Take 17 g by mouth daily.       sodium phosphates 133 mL (FOR FLEET) 19-7 gram/118 mL Enem rectal enema Insert 1 enema into the rectum every 3 (three) days.       tiZANidine (ZANAFLEX) 4 MG tablet Take 4 mg by  mouth every 8 (eight) hours as needed.       No current facility-administered medications for this visit.      REVIEW OF SYSTEMS:    Currently, no fever, chills, or rigors. Does not have any visual or hearing problems. Denies any chest pain, headaches, palpitations, lightheadedness, dizziness, shortness of breath, or cough. Appetite is good. Denies any GERD symptoms. Denies any difficulty with swallowing, nausea, or vomiting.  Denies any abdominal pain, diarrhea or constipation. Denies any urinary symptoms. No insomnia. No active bleeding. No rash.     PHYSICAL EXAMINATION:  Vitals:    01/31/18 2039   BP: 128/70   Pulse: 86   Resp: 18   Temp: 97.7  F (36.5  C)   SpO2: 96%   Weight: 123 lb 6.4 oz (56 kg)       GENERAL: Awake, Alert, oriented x3, not in any form of acute distress, answers questions appropriately, follows simple commands, conversant  HEENT: Head is normocephalic with normal hair distribution. No evidence of trauma. Ears: No acute purulent discharge. Eyes: Conjunctivae pink with no scleral jaundice. Nose: Normal mucosa and septum. NECK: Supple with no cervical or supraclavicular lymphadenopathy. Trachea is midline.   CHEST: No tenderness or deformity, no crepitus  LUNG: Clear to auscultation with good chest expansion. There are no crackles or wheezes, normal AP diameter.  BACK: No kyphosis of the thoracic spine. Symmetric, no curvature, ROM normal, no CVA tenderness, no spinal tenderness   CVS: There is good S1  S2, there are no murmurs, rubs, gallops, or heaves, rhythm is regular,  2+ pulses symmetric in all extremities.  ABDOMEN: Globular and soft, nontender to palpation, non distended, no masses, no organomegaly, good bowel sounds, no rebound or guarding, no peritoneal signs.   EXTREMITIES: Full range of motion on both upper and lower extremities, there is no tenderness to palpation, bilateral pedal edema, no cyanosis or clubbing, no calf tenderness.  Pulses equal in all extremities, normal cap  refill, no joint swelling.  SKIN: Warm and dry, no erythema noted.  Skin color, texture, no rashes or lesions.  NEUROLOGICAL: The patient is oriented to person, place and time. Strength and sensation are grossly intact. Face is symmetric.    LABS:      Lab Results   Component Value Date    HGB 8.1 (L) 10/11/2017     ASSESSMENT/PLAN:    1. Pain management - Worsening, will adjust Norco to 2 tab BID- prior to therapy and every 6 hours prn and give Zanaflex at bedtime.  Continue Gabapentin and Tylenol   2. Suspected deep tissue injury - Off load every 2 hours and start barrier cream   3. Closed fracture of right superior pubic ramus with routine healing, subsequent encounter - See #1 and continue to work with therapy, followed by Orthopedics.  Continue Calcium.     4. Osteoporosis, unspecified osteoporosis type, unspecified pathological fracture presence - Continue Calcium   5. Hyperlipidemia, unspecified hyperlipidemia type - Continue Lipitor   6. Essential hypertension - Blood pressures are within target range, will continue Chlorthalidone.              Electronically signed by:  Giovani Perdomo CNP

## 2021-06-16 NOTE — PROGRESS NOTES
[facility]    DATE: 2018    NAME:  Daksha Chapman             :  10/29/1929  MRN: [id number]  CODE STATUS:  DNR    FACILITY:  [facility] [[id number]]       ROOM:   215    CHIEF COMPLAINT/REASON FOR VISIT:  Chief Complaint   Patient presents with     Problem Visit     Pain management     HISTORY OF PRESENT ILLNESS: Daksha Chapman is a 88 y.o. female who presented to the ED with right hip pain sustained after a mechanical fall at home.  She was ambulating in the kitchen with her walker and slipped. She landed on her right hip and hit the aback of her head but had no LOC.  Imaging revealed a right pubic rami fracture.  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 Due to her injuries she wasn't able to return home and was transferred to TCU for continued rehabilitaiton.     Today, patient is seen sitting in her recliner at the bedside.  She reports significant improvement in her pain since the initiation of Naproxen.  She is doing much better in therapy and she reports an improvement in her mood.  She is ambulating with a cane. Patient was started on a HNS daily due to poor appetite, however, patient doesn't want to gain any more weight.  Usual reported weight is 128 lbs and she is currently 131 lbs. Patient will discharge to home on 2018, patient driven.    Past Medical History:   Diagnosis Date     Arthritis      Closed fracture of superior ramus of right pubis      HLD (hyperlipidemia)      HTN (hypertension)      Spinal stenosis      Past Surgical History:   Procedure Laterality Date     CARPAL TUNNEL RELEASE       DILATION AND CURETTAGE OF UTERUS       LUMBAR LAMINECTOMY      spinal stenosis     MANIPULATION KNEE JOINT Left 04/15/2013     TONSILLECTOMY AND ADENOIDECTOMY       TOTAL HIP ARTHROPLASTY Bilateral      Family History   Problem Relation Age of Onset     Breast cancer Mother      Heart disease Mother      Hypertension  Father      Stroke Father      Social History     Social History     Marital status: Unknown     Spouse name: N/A     Number of children: N/A     Years of education: N/A     Occupational History     Not on file.     Social History Main Topics     Smoking status: Never Smoker     Smokeless tobacco: Never Used     Alcohol use No     Drug use: No     Sexual activity: Not on file     Other Topics Concern     Not on file     Social History Narrative     No narrative on file     Allergies   Allergen Reactions     Blood-Group Specific Substance Other (See Comments)     Patient has anti-M.  Blood product orders maybe delayed.  Please draw one red top and two purple top tubes for all Type and Screen/Type and Crossmatch orders.     Current Outpatient Prescriptions   Medication Sig Dispense Refill     acetaminophen (TYLENOL) 325 MG tablet Take 650 mg by mouth every 6 (six) hours as needed for pain.       aspirin 81 MG EC tablet Take 81 mg by mouth daily.       atorvastatin (LIPITOR) 20 MG tablet Take 20 mg by mouth at bedtime.       bisacodyl (DULCOLAX) 10 mg suppository Insert 10 mg into the rectum daily as needed.       calcium, as carbonate, (TUMS) 200 mg calcium (500 mg) chewable tablet Chew 1 tablet 3 (three) times a day as needed for heartburn.       chlorthalidone (HYGROTEN) 25 MG tablet Take 12.5 mg by mouth daily.       gabapentin (NEURONTIN) 300 MG capsule Take 300 mg by mouth 2 (two) times a day.       HYDROcodone-acetaminophen 5-325 mg per tablet Take 1-2 tablets by mouth every 6 (six) hours as needed for pain.       melatonin 3 mg Tab tablet Take 3 mg by mouth at bedtime.       MULTIVIT-MIN/FA/LYCOPEN/LUTEIN (CERTAVITE SENIOR-ANTIOXIDANT ORAL) Take 1 tablet by mouth daily.       naproxen (NAPROSYN) 500 MG tablet Take 500 mg by mouth 2 (two) times a day with meals.       polyethylene glycol (MIRALAX) 17 gram packet Take 17 g by mouth daily.       sodium phosphates 133 mL (FOR FLEET) 19-7 gram/118 mL Enem rectal  enema Insert 1 enema into the rectum every 3 (three) days.       tiZANidine (ZANAFLEX) 4 MG tablet Take 4 mg by mouth every 8 (eight) hours as needed.       No current facility-administered medications for this visit.      REVIEW OF SYSTEMS:    Currently, no fever, chills, or rigors. Does not have any visual or hearing problems. Denies any chest pain, headaches, palpitations, lightheadedness, dizziness, shortness of breath, or cough. Appetite is good. Denies any GERD symptoms. Denies any difficulty with swallowing, nausea, or vomiting.  Denies any abdominal pain, diarrhea or constipation. Denies any urinary symptoms. No insomnia. No active bleeding. No rash.     PHYSICAL EXAMINATION:  Vitals:    02/13/18 2008   BP: 130/63   Pulse: 73   Resp: 16   Temp: 97.3  F (36.3  C)   SpO2: 97%   Weight: 131 lb (59.4 kg)       GENERAL: Awake, Alert, oriented x3, not in any form of acute distress, answers questions appropriately, follows simple commands, conversant  HEENT: Head is normocephalic with normal hair distribution. No evidence of trauma. Ears: No acute purulent discharge. Eyes: Conjunctivae pink with no scleral jaundice. Nose: Normal mucosa and septum. NECK: Supple with no cervical or supraclavicular lymphadenopathy. Trachea is midline.   CHEST: No tenderness or deformity, no crepitus  LUNG: Clear to auscultation with good chest expansion. There are no crackles or wheezes, normal AP diameter.  BACK: No kyphosis of the thoracic spine. Symmetric, no curvature, ROM normal, no CVA tenderness, no spinal tenderness   CVS: There is good S1  S2, there are no murmurs, rubs, gallops, or heaves, rhythm is regular,  2+ pulses symmetric in all extremities.  ABDOMEN: Globular and soft, nontender to palpation, non distended, no masses, no organomegaly, good bowel sounds, no rebound or guarding, no peritoneal signs.   EXTREMITIES: Full range of motion on both upper and lower extremities, there is no tenderness to palpation, bilateral  pedal edema, no cyanosis or clubbing, no calf tenderness.  Pulses equal in all extremities, normal cap refill, no joint swelling.  SKIN: Warm and dry, no erythema noted.  Skin color, texture, no rashes or lesions.  NEUROLOGICAL: The patient is oriented to person, place and time. Strength and sensation are grossly intact. Face is symmetric.    LABS:      Lab Results   Component Value Date    HGB 8.1 (L) 10/11/2017     ASSESSMENT/PLAN:    1. Pain management - Improved with the initiation of Naproxen   2. Essential hypertension - Blood pressures are within target range, will continue Chlorthalidone   3. Closed fracture of right superior pubic ramus with routine healing, subsequent encounter - Continue PT/OT   4. Insomnia - Continue Melatonin   5. Bilateral lower extremity edema - Continue LORETO calie, on in am and off in the pm                       Electronically signed by:  Giovani Perdomo CNP

## 2021-06-16 NOTE — PROGRESS NOTES
[facility]    Facility:   [facility] [[id number]]   Code Status: DNR      CHIEF COMPLAINT/REASON FOR VISIT:  Chief Complaint   Patient presents with     Review Of Multiple Medical Conditions       HISTORY:      HPI: Daksha is a 88 y.o. female who is recovering from a pelvic fracture.  However it is very discouraging for her because the pain level is still extremely high and she is only able to walk anywhere from 5 feet to 30 feet.  She tried the tramadol and it did not help out the pain and she would like to discontinue this.  She has used ibuprofen in the past for back pain but there is a family history for peptic ulcer disease and so she is somewhat concerned about that risk as well and even though the medicine was effective in the past, she limited its use.      On current review of systems she is without fevers or chills, no nasal congestion or sore throat, no cough nor shortness of breath nor chest pain.  No abdominal pain or nausea.  No bowel changes.  No dysuria.  No headache.    Past Medical History:   Diagnosis Date     Arthritis      Closed fracture of superior ramus of right pubis      HLD (hyperlipidemia)      HTN (hypertension)      Spinal stenosis              Family History   Problem Relation Age of Onset     Breast cancer Mother      Heart disease Mother      Hypertension Father      Stroke Father      Social History     Social History     Marital status: Unknown     Spouse name: N/A     Number of children: N/A     Years of education: N/A     Social History Main Topics     Smoking status: Never Smoker     Smokeless tobacco: Never Used     Alcohol use No     Drug use: No     Sexual activity: Not on file     Other Topics Concern     Not on file     Social History Narrative     No narrative on file         Review of Systems    .  Vitals:    02/07/18 0845   BP: 134/66   Pulse: 72   Resp: 18   Temp: 96.7  F (35.9  C)       Physical Exam   Constitutional: No distress.    Eyes: Right eye exhibits no discharge. Left eye exhibits no discharge.   Cardiovascular: Normal rate, regular rhythm and normal heart sounds.    Pulmonary/Chest: Effort normal and breath sounds normal.   Abdominal: She exhibits no distension. There is no tenderness.   Musculoskeletal: She exhibits no edema.   Neurological: She is alert.   Psychiatric: She has a normal mood and affect.   Nursing note and vitals reviewed.        LABS:   No new laboratory testing    ASSESSMENT:      ICD-10-CM    1. Closed fracture of right superior pubic ramus with routine healing, subsequent encounter S32.511D    2. Pain management R52        PLAN:    Naproxen 500 mg twice daily.  I mentioned that this would allow her to have the pain medication in her body at all times and by taking it with meals and the fact that naproxen is easier on the stop and she might experience dyspepsia.  Tramadol was discontinued at her request.    Total 15 minutes of which 50 % was spent counseling and coordination of care of the above plan.    Electronically signed by: Jackson Weston MD

## 2021-06-16 NOTE — PROGRESS NOTES
[facility]    Facility:   [facility] [[id number]]   Code Status: DNR  PCP: No Primary Care Provider   Phone: None   Fax: [phone]      CHIEF COMPLAINT/REASON FOR VISIT:  Chief Complaint   Patient presents with     Discharge Summary       HISTORY COURSE:  Daksha is an 88 y.o. female who fell in the kitchen and fractured her right superior pubic ramus.  After hospitalization she was sent to transitional care unit for further therapies and monitoring of her progress of strengthening and endurance and gait stability.  She has underlying medical problems of osteoporosis and osteoarthritis in multiple sites.  She is status post total hip arthroplasty bilaterally and she also has lumbar spinal stenosis with radiculitis.  She was  2 years ago after 50 years of marriage.  Her stepson lives in [place].  She considers [facility] in [place] as her [employer] family.    In the past week she has had significant improvement of pain management with the start of naproxen.  Now she is able to walk for 120 feet.  She denies any need for home services although medically I think this would be of value and yet she has had previous instruction and knows all the various exercises that she needs to do an activity she needs to do.  She has not experienced abdominal pain or indigestion or nausea since the start of naproxen.  She probably does not need the Norco 5-325 mg, so this could be discontinued or a prescription for #20 can be used in the near future as she is adjusting again to the home setting in case pain does recur.  Also in case she has problems with stomach distress with the naproxen since there is a family history for peptic ulcer disease, however she has been able to tolerate ibuprofen in the past without difficulty.    On other review of systems currently she is not having fevers or chills, no nasal congestion or sore throat, no cough or shortness of breath or chest pain.  She is  not having problems with bowels and she is not having dysuria.    Review of Systems    Vitals:    02/14/18 0917   BP: 146/68   Pulse: 76   Resp: 16   Temp: 97  F (36.1  C)   SpO2: 97%       Physical Exam   Constitutional: She is oriented to person, place, and time. No distress.   Cardiovascular: Normal rate, regular rhythm and normal heart sounds.    Pulmonary/Chest: Effort normal and breath sounds normal.   Abdominal: Soft. Bowel sounds are normal. She exhibits no distension. There is no tenderness.   Musculoskeletal: She exhibits no edema.   Neurological: She is alert and oriented to person, place, and time.   Psychiatric: She has a normal mood and affect.   Nursing note and vitals reviewed.      MEDICATION LIST:  Current Outpatient Prescriptions   Medication Sig     acetaminophen (TYLENOL) 325 MG tablet Take 650 mg by mouth every 6 (six) hours as needed for pain.     aspirin 81 MG EC tablet Take 81 mg by mouth daily.     atorvastatin (LIPITOR) 20 MG tablet Take 20 mg by mouth at bedtime.     bisacodyl (DULCOLAX) 10 mg suppository Insert 10 mg into the rectum daily as needed.     calcium, as carbonate, (TUMS) 200 mg calcium (500 mg) chewable tablet Chew 1 tablet 3 (three) times a day as needed for heartburn.     chlorthalidone (HYGROTEN) 25 MG tablet Take 12.5 mg by mouth daily.     gabapentin (NEURONTIN) 300 MG capsule Take 300 mg by mouth 2 (two) times a day.     HYDROcodone-acetaminophen 5-325 mg per tablet Take 1-2 tablets by mouth every 6 (six) hours as needed for pain.     melatonin 3 mg Tab tablet Take 3 mg by mouth at bedtime.     MULTIVIT-MIN/FA/LYCOPEN/LUTEIN (CERTAVITE SENIOR-ANTIOXIDANT ORAL) Take 1 tablet by mouth daily.     naproxen (NAPROSYN) 500 MG tablet Take 500 mg by mouth 2 (two) times a day with meals.     polyethylene glycol (MIRALAX) 17 gram packet Take 17 g by mouth daily.     sodium phosphates 133 mL (FOR FLEET) 19-7 gram/118 mL Enem rectal enema Insert 1 enema into the rectum every 3  (three) days.     tiZANidine (ZANAFLEX) 4 MG tablet Take 4 mg by mouth every 8 (eight) hours as needed.       DISCHARGE DIAGNOSIS:    ICD-10-CM    1. Closed fracture of right superior pubic ramus with routine healing, subsequent encounter S32.511D    2. Pain management R52    3. Primary osteoarthritis involving multiple joints M15.0    4. Osteoporosis, unspecified osteoporosis type, unspecified pathological fracture presence M81.0        MEDICAL EQUIPMENT NEEDS:  No extensive equipment required other than the usual walker, etc.    DISCHARGE PLAN/FACE TO FACE:  I certify that services are/were furnished while this patient was under the care of a physician and that a physician or an allowed non-physician practitioner (NPP), had a face-to-face encounter that meets the physician face-to-face encounter requirements. The encounter was in whole, or in part, related to the primary reason for home health. The patient is confined to his/her home and needs intermittent skilled nursing, physical therapy, speech-language pathology, or the continued need for occupational therapy. A plan of care has been established by a physician and is periodically reviewed by a physician.  Date of Face-to-Face Encounter: 2/15/18    I certify that, based on my findings, the following services are medically necessary home health services: PT, OT, RN, home health aide    My clinical findings support the need for the above skilled services because: (Please write a brief narrative summary that describes what the RN, PT, SLP, or other services will be doing in the home. A list of diagnoses in this section does not meet the CMS requirements.)  Registered nurse would be 4 visits for medication set up and teaching, symptom and disease process monitoring, and education.  Home health aide services for bathing and ADL needs.  Physical therapy to evaluate and treat for strengthening, balance, endurance, and safety with mobility and ambulation.  Home  occupational therapy to evaluate and treat for strengthening, ADL needs, adaptive equipment, and safety.    This patient is homebound because: (Please write a brief narrative summary describing the functional limitations as to why this patient is homebound and specifically what makes this patient homebound.)  The pain which can be intermittent in nature of severity can limit her safety with ambulation.    The patient is, or has been, under my care and I have initiated the establishment of the plan of care. This patient will be followed by a physician who will periodically review the plan of care.    Schedule follow up visit with primary care provider within 7 days to reestablish care.    Electronically signed by: Jackson Weston MD